# Patient Record
Sex: FEMALE | Race: OTHER | Employment: UNEMPLOYED | URBAN - METROPOLITAN AREA
[De-identification: names, ages, dates, MRNs, and addresses within clinical notes are randomized per-mention and may not be internally consistent; named-entity substitution may affect disease eponyms.]

---

## 2018-04-19 ENCOUNTER — TELEPHONE (OUTPATIENT)
Dept: OBGYN CLINIC | Facility: CLINIC | Age: 23
End: 2018-04-19

## 2018-04-19 NOTE — TELEPHONE ENCOUNTER
Per pt she states that she was told her records were faxed last week. Informed pt that records have not been received yet. She will be calling practice to have them refax them.

## 2018-04-30 NOTE — TELEPHONE ENCOUNTER
Pt calling to check on records. Records were in  and placed on 1 [facility] desk for review. Informed pt that providers will need to review records and a call will be made to her once they've made a decision. Verbalized understanding.

## 2018-04-30 NOTE — TELEPHONE ENCOUNTER
Informed pt that records have not been received yet. [facility] she will try to get records personally and bring them herself to office for them to be reviewed. [facility] she has not had a recent apt and is currently about 28wks.

## 2018-04-30 NOTE — TELEPHONE ENCOUNTER
Pt called back and states she was told by clinic that it would take up to 30 days for her to be able to get records/us to receive her records. She states she will try her best to obtain them and will call office back.

## 2018-05-02 NOTE — TELEPHONE ENCOUNTER
Pt informed that per MLM and AJB she can start PN care with office. Scheduled PCV with MLM this Monday 5/7.

## 2018-05-07 ENCOUNTER — OFFICE VISIT (OUTPATIENT)
Dept: OBGYN CLINIC | Facility: CLINIC | Age: 23
End: 2018-05-07

## 2018-05-07 ENCOUNTER — TELEPHONE (OUTPATIENT)
Dept: OBGYN CLINIC | Facility: CLINIC | Age: 23
End: 2018-05-07

## 2018-05-07 ENCOUNTER — NURSE ONLY (OUTPATIENT)
Dept: OBGYN CLINIC | Facility: CLINIC | Age: 23
End: 2018-05-07

## 2018-05-07 VITALS — SYSTOLIC BLOOD PRESSURE: 109 MMHG | DIASTOLIC BLOOD PRESSURE: 65 MMHG

## 2018-05-07 DIAGNOSIS — Z34.83 ENCOUNTER FOR SUPERVISION OF OTHER NORMAL PREGNANCY IN THIRD TRIMESTER: Primary | ICD-10-CM

## 2018-05-07 DIAGNOSIS — Z34.03 ENCOUNTER FOR SUPERVISION OF NORMAL FIRST PREGNANCY IN THIRD TRIMESTER: Primary | ICD-10-CM

## 2018-05-07 PROCEDURE — 0502F SUBSEQUENT PRENATAL CARE: CPT | Performed by: OBSTETRICS & GYNECOLOGY

## 2018-05-07 RX ORDER — PRENATAL VIT/IRON FUM/FOLIC AC 27MG-0.8MG
1 TABLET ORAL DAILY
COMMUNITY

## 2018-05-07 NOTE — TELEPHONE ENCOUNTER
Reviewed Prenatal Record from [facility]. Was unable to read PN lab results. Called [facility] at [phone] to request new fax.   Was told by staff member Dennie Leber that they would refax, but couldn't do it at this time because fax wasn't Ambika Haynes

## 2018-05-07 NOTE — PROGRESS NOTES
Pt here today for OB RN  Education visit. Pt currently 29w0d by LMP. . Pt transfer of care from [facility].    Educational material reviewed including nutrition, exercise, warning signs, labor precautions. Pt verbalized understanding.  1 hr

## 2018-05-07 NOTE — PROGRESS NOTES
HPI:    Patient ID: Viviana Hart is a 23year old female. HPI  Patient here for PCV. Transfer of Care. Previous  followed by successful . Desires . Needs CBC and 1 hour today. Records reviewed.   This is a 30 minute visit and gr

## 2018-05-08 ENCOUNTER — TELEPHONE (OUTPATIENT)
Dept: OBGYN CLINIC | Facility: CLINIC | Age: 23
End: 2018-05-08

## 2018-05-08 ENCOUNTER — LAB ENCOUNTER (OUTPATIENT)
Dept: LAB | Facility: HOSPITAL | Age: 23
End: 2018-05-08
Attending: OBSTETRICS & GYNECOLOGY
Payer: MEDICAID

## 2018-05-08 DIAGNOSIS — Z34.83 ENCOUNTER FOR SUPERVISION OF OTHER NORMAL PREGNANCY IN THIRD TRIMESTER: ICD-10-CM

## 2018-05-08 PROCEDURE — 36415 COLL VENOUS BLD VENIPUNCTURE: CPT

## 2018-05-08 PROCEDURE — 85025 COMPLETE CBC W/AUTO DIFF WBC: CPT

## 2018-05-08 PROCEDURE — 82950 GLUCOSE TEST: CPT

## 2018-05-08 NOTE — TELEPHONE ENCOUNTER
Message   Received: Today   Message Contents   Garrick Pascual MD  [facility] Clinical Staff             Normal 1 hour GTT.  Notify patient. Rosibel Mcknight is slightly anemic.  May take Iron in addition to her PNV.

## 2018-05-14 ENCOUNTER — ROUTINE PRENATAL (OUTPATIENT)
Dept: OBGYN CLINIC | Facility: CLINIC | Age: 23
End: 2018-05-14

## 2018-05-14 ENCOUNTER — TELEPHONE (OUTPATIENT)
Dept: OBGYN CLINIC | Facility: CLINIC | Age: 23
End: 2018-05-14

## 2018-05-14 VITALS — WEIGHT: 157 LBS | BODY MASS INDEX: 27 KG/M2 | SYSTOLIC BLOOD PRESSURE: 110 MMHG | DIASTOLIC BLOOD PRESSURE: 62 MMHG

## 2018-05-14 DIAGNOSIS — Z34.83 ENCOUNTER FOR SUPERVISION OF OTHER NORMAL PREGNANCY IN THIRD TRIMESTER: Primary | ICD-10-CM

## 2018-05-14 DIAGNOSIS — Z23 NEED FOR VACCINATION: ICD-10-CM

## 2018-05-14 PROCEDURE — 81003 URINALYSIS AUTO W/O SCOPE: CPT | Performed by: ADVANCED PRACTICE MIDWIFE

## 2018-05-14 PROCEDURE — 0502F SUBSEQUENT PRENATAL CARE: CPT | Performed by: ADVANCED PRACTICE MIDWIFE

## 2018-05-14 PROCEDURE — 90715 TDAP VACCINE 7 YRS/> IM: CPT | Performed by: ADVANCED PRACTICE MIDWIFE

## 2018-05-14 PROCEDURE — 90471 IMMUNIZATION ADMIN: CPT | Performed by: ADVANCED PRACTICE MIDWIFE

## 2018-05-14 RX ORDER — BREAST PUMP
EACH MISCELLANEOUS
Qty: 1 EACH | Refills: 0 | Status: SHIPPED | OUTPATIENT
Start: 2018-05-14

## 2018-05-14 RX ORDER — BREAST PUMP
EACH MISCELLANEOUS
Qty: 1 EACH | Refills: 0 | Status: SHIPPED | OUTPATIENT
Start: 2018-05-14 | End: 2018-06-14

## 2018-05-14 NOTE — PROGRESS NOTES
S.  Denies BENDER, vision change, URQ pain, swelling  Baby is active its a girl. EDPS 0, is a homemaker.   Patient transferred care in third trimester from another office  O  See above  A.  [id number] S = D at 30 weeks  Previous  followed by [facility]

## 2018-05-14 NOTE — TELEPHONE ENCOUNTER
Pharmacy calling states pt was prescribed a breast pump and they don't have that or provide that at the pharmacy. Pharmacy tried contacting pt.

## 2018-05-18 NOTE — TELEPHONE ENCOUNTER
No records faxed. [employer] again to f/u. Was informed by office they would fax labs/ u/s now.

## 2018-05-29 ENCOUNTER — ROUTINE PRENATAL (OUTPATIENT)
Dept: OBGYN CLINIC | Facility: CLINIC | Age: 23
End: 2018-05-29

## 2018-05-29 VITALS
DIASTOLIC BLOOD PRESSURE: 65 MMHG | SYSTOLIC BLOOD PRESSURE: 102 MMHG | WEIGHT: 156.63 LBS | HEART RATE: 90 BPM | BODY MASS INDEX: 27 KG/M2

## 2018-05-29 DIAGNOSIS — Z34.83 ENCOUNTER FOR SUPERVISION OF OTHER NORMAL PREGNANCY IN THIRD TRIMESTER: Primary | ICD-10-CM

## 2018-05-29 PROCEDURE — 81002 URINALYSIS NONAUTO W/O SCOPE: CPT | Performed by: OBSTETRICS & GYNECOLOGY

## 2018-05-29 PROCEDURE — 0502F SUBSEQUENT PRENATAL CARE: CPT | Performed by: OBSTETRICS & GYNECOLOGY

## 2018-05-29 NOTE — PROGRESS NOTES
Good fm. Hx of prior c/s, prior . Pt req us for growth 3rd trimester, order entered, to schedule in 2-3 weeks.

## 2018-06-06 ENCOUNTER — TELEPHONE (OUTPATIENT)
Dept: OBGYN CLINIC | Facility: CLINIC | Age: 23
End: 2018-06-06

## 2018-06-06 NOTE — TELEPHONE ENCOUNTER
Pt at dentist now and needs a note or form for ok for pt to be seen.  Please advise dentist fax # [phone]

## 2018-06-11 ENCOUNTER — TELEPHONE (OUTPATIENT)
Dept: OBGYN CLINIC | Facility: CLINIC | Age: 23
End: 2018-06-11

## 2018-06-11 DIAGNOSIS — Z98.891 PREVIOUS CESAREAN SECTION: Primary | ICD-10-CM

## 2018-06-12 NOTE — TELEPHONE ENCOUNTER
[place]i phone line  #[phone] used to translate phone call. Message left on pt's voicemail that her U/S was ordered and she can call [phone] to schedule appointment. Pt can call [phone] with any questions or concerns.

## 2018-06-13 ENCOUNTER — HOSPITAL ENCOUNTER (OUTPATIENT)
Dept: PERINATAL CARE | Facility: HOSPITAL | Age: 23
Discharge: HOME | End: 2018-06-13
Attending: OBSTETRICS & GYNECOLOGY
Payer: MEDICAID

## 2018-06-13 VITALS — DIASTOLIC BLOOD PRESSURE: 67 MMHG | SYSTOLIC BLOOD PRESSURE: 110 MMHG | HEART RATE: 114 BPM

## 2018-06-13 DIAGNOSIS — Z98.891 PREVIOUS CESAREAN SECTION: ICD-10-CM

## 2018-06-13 DIAGNOSIS — O09.30 LATE PRENATAL CARE: Primary | ICD-10-CM

## 2018-06-13 DIAGNOSIS — Z36.3 ENCOUNTER FOR ANTENATAL SCREENING FOR MALFORMATION USING ULTRASOUND: ICD-10-CM

## 2018-06-13 DIAGNOSIS — O09.30 LATE PRENATAL CARE: ICD-10-CM

## 2018-06-13 PROCEDURE — 76811 OB US DETAILED SNGL FETUS: CPT | Performed by: OBSTETRICS & GYNECOLOGY

## 2018-06-13 PROCEDURE — 99243 OFF/OP CNSLTJ NEW/EST LOW 30: CPT | Performed by: OBSTETRICS & GYNECOLOGY

## 2018-06-13 NOTE — PROGRESS NOTES
Moira Phalen is a 23year old female. Reason for Consult:   Late prenatal care. Doing well. No complaints.     Review of History:     OB History:    Obstetric History     T2    L2    SAB0  TAB0  Ectopic0  Multiple0  Live Births2 OF BREAST, NEEDLE CORE Right  10/3/2016: BREAST BIOPSY Right      Comment: I & D breast abscess  2012:    Family History   Problem Relation Age of Onset   • Asthma Maternal Grandmother    • Arthritis Maternal Grandfather       Smoking status:   RECOMMENDATIONS:   1. Fetal movement counts discussed    Thank you for allowing me to participate in the care of your patient. Please do not hesitate to call with any questions or concerns.      Total patient time was 40 minutes in evaluation, c

## 2018-06-14 ENCOUNTER — ROUTINE PRENATAL (OUTPATIENT)
Dept: OBGYN CLINIC | Facility: CLINIC | Age: 23
End: 2018-06-14

## 2018-06-14 VITALS
DIASTOLIC BLOOD PRESSURE: 63 MMHG | BODY MASS INDEX: 27 KG/M2 | HEART RATE: 81 BPM | WEIGHT: 155 LBS | SYSTOLIC BLOOD PRESSURE: 103 MMHG

## 2018-06-14 DIAGNOSIS — Z34.83 ENCOUNTER FOR SUPERVISION OF OTHER NORMAL PREGNANCY IN THIRD TRIMESTER: Primary | ICD-10-CM

## 2018-06-14 PROCEDURE — 0502F SUBSEQUENT PRENATAL CARE: CPT | Performed by: OBSTETRICS & GYNECOLOGY

## 2018-06-14 PROCEDURE — 81002 URINALYSIS NONAUTO W/O SCOPE: CPT | Performed by: OBSTETRICS & GYNECOLOGY

## 2018-06-25 ENCOUNTER — ROUTINE PRENATAL (OUTPATIENT)
Dept: OBGYN CLINIC | Facility: CLINIC | Age: 23
End: 2018-06-25

## 2018-06-25 VITALS
DIASTOLIC BLOOD PRESSURE: 62 MMHG | SYSTOLIC BLOOD PRESSURE: 114 MMHG | WEIGHT: 155 LBS | BODY MASS INDEX: 27 KG/M2 | HEART RATE: 99 BPM

## 2018-06-25 DIAGNOSIS — Z34.83 ENCOUNTER FOR SUPERVISION OF OTHER NORMAL PREGNANCY IN THIRD TRIMESTER: Primary | ICD-10-CM

## 2018-06-25 PROCEDURE — 81002 URINALYSIS NONAUTO W/O SCOPE: CPT | Performed by: OBSTETRICS & GYNECOLOGY

## 2018-06-25 PROCEDURE — 0502F SUBSEQUENT PRENATAL CARE: CPT | Performed by: OBSTETRICS & GYNECOLOGY

## 2018-06-25 NOTE — PROGRESS NOTES
[facility], [facility]  Obstetrics and Gynecology  Prenatal Visit  Ellis Pearl MD    MOISÉS Watt is a 23year old.o. Avelino Justice 36w0d weeks. Here for routine prenatal visit and is without complaints.   Patient denies any regular uterine contra pregnancy in third trimester  (primary encounter diagnosis)  Plan: URINALYSIS NONAUTO W/O SCOPE, STREP B CULTURE    Plan   Group B beta strep culture done. Labor instructions given. Patient follow-up in 1 week for prenatal visit.   Kian Davila MD, [facility]

## 2018-07-05 ENCOUNTER — TELEPHONE (OUTPATIENT)
Dept: OBGYN CLINIC | Facility: CLINIC | Age: 23
End: 2018-07-05

## 2018-07-05 NOTE — TELEPHONE ENCOUNTER
Dr. Anne Raymond, pt has [employer] now, which we do not accept anymore. Is this patient a high risk pt whom we should keep? Or continue with advising them to contact a provider within that network. Please advise.

## 2018-07-05 NOTE — TELEPHONE ENCOUNTER
37 6/7 weeks pregnant, per pt she has O [place] now, would like know if she needs to change OB GYN, or can the office make a exception ?

## 2018-07-06 NOTE — TELEPHONE ENCOUNTER
P/A faxed to [facility]. Pt inquired where she can get her lab work done. The hospital refused her and told her she couldn't get her lab work here due to new insurance change. I instructed her to contact [facility] and see who is in network for that, and if they needed anything from us to contact our office.

## 2018-07-06 NOTE — TELEPHONE ENCOUNTER
Spoke to pt and explained we will need to authorize each PN visit, due to JEANINE's message below. Transferred her call to [facility] who will make those future appts for her. Explained to her that we will continue to see her through her PN and PP, but after that she will need to contact a provider who accepts [employer] for all future Gyne. She mentioned she has an office in mind that will accept her with new insurance. She Understood and verbalized agreement.

## 2018-07-09 ENCOUNTER — ROUTINE PRENATAL (OUTPATIENT)
Dept: OBGYN CLINIC | Facility: CLINIC | Age: 23
End: 2018-07-09

## 2018-07-09 VITALS
WEIGHT: 156.63 LBS | BODY MASS INDEX: 27 KG/M2 | DIASTOLIC BLOOD PRESSURE: 59 MMHG | SYSTOLIC BLOOD PRESSURE: 102 MMHG | HEART RATE: 88 BPM

## 2018-07-09 DIAGNOSIS — Z34.83 ENCOUNTER FOR SUPERVISION OF OTHER NORMAL PREGNANCY IN THIRD TRIMESTER: Primary | ICD-10-CM

## 2018-07-09 LAB
APPEARANCE: CLEAR
MULTISTIX LOT#: NORMAL NUMERIC
PH, URINE: 6.5 (ref 4.5–8)
SPECIFIC GRAVITY: 1.02 (ref 1–1.03)
URINE-COLOR: YELLOW
UROBILINOGEN,SEMI-QN: 0.2 MG/DL (ref 0–1.9)

## 2018-07-09 PROCEDURE — 81002 URINALYSIS NONAUTO W/O SCOPE: CPT | Performed by: OBSTETRICS & GYNECOLOGY

## 2018-07-09 PROCEDURE — 0502F SUBSEQUENT PRENATAL CARE: CPT | Performed by: OBSTETRICS & GYNECOLOGY

## 2018-07-09 NOTE — TELEPHONE ENCOUNTER
Pt returning call with [facility] care number  States [facility] has not received anything from [facility]  [phone]

## 2018-07-11 ENCOUNTER — HOSPITAL ENCOUNTER (INPATIENT)
Facility: HOSPITAL | Age: 23
LOS: 2 days | Discharge: HOME | End: 2018-07-13
Attending: OBSTETRICS & GYNECOLOGY | Admitting: OBSTETRICS & GYNECOLOGY
Payer: COMMERCIAL

## 2018-07-11 ENCOUNTER — TELEPHONE (OUTPATIENT)
Dept: OBGYN CLINIC | Facility: CLINIC | Age: 23
End: 2018-07-11

## 2018-07-11 PROBLEM — O42.90 AMNIOTIC FLUID LEAKING: Status: ACTIVE | Noted: 2018-07-11

## 2018-07-11 LAB
ANTIBODY SCREEN: NEGATIVE
ERYTHROCYTE [DISTWIDTH] IN BLOOD BY AUTOMATED COUNT: 14.3 % (ref 11–15)
HCT VFR BLD AUTO: 31.9 % (ref 35–48)
HGB BLD-MCNC: 10.4 G/DL (ref 12–16)
MCH RBC QN AUTO: 27.8 PG (ref 27–32)
MCHC RBC AUTO-ENTMCNC: 32.5 G/DL (ref 32–37)
MCV RBC AUTO: 85.5 FL (ref 80–100)
PLATELET # BLD AUTO: 215 K/UL (ref 140–400)
PMV BLD AUTO: 7.1 FL (ref 7.4–10.3)
RBC # BLD AUTO: 3.73 M/UL (ref 3.7–5.4)
RH BLOOD TYPE: POSITIVE
WBC # BLD AUTO: 6 K/UL (ref 4–11)

## 2018-07-11 PROCEDURE — 0KQM0ZZ REPAIR PERINEUM MUSCLE, OPEN APPROACH: ICD-10-PCS | Performed by: OBSTETRICS & GYNECOLOGY

## 2018-07-11 RX ORDER — LIDOCAINE HYDROCHLORIDE 10 MG/ML
30 INJECTION, SOLUTION EPIDURAL; INFILTRATION; INTRACAUDAL; PERINEURAL ONCE
Status: COMPLETED | OUTPATIENT
Start: 2018-07-11 | End: 2018-07-11

## 2018-07-11 RX ORDER — TRISODIUM CITRATE DIHYDRATE AND CITRIC ACID MONOHYDRATE 500; 334 MG/5ML; MG/5ML
30 SOLUTION ORAL AS NEEDED
Status: DISCONTINUED | OUTPATIENT
Start: 2018-07-11 | End: 2018-07-11 | Stop reason: HOSPADM

## 2018-07-11 RX ORDER — DEXTROSE, SODIUM CHLORIDE, SODIUM LACTATE, POTASSIUM CHLORIDE, AND CALCIUM CHLORIDE 5; .6; .31; .03; .02 G/100ML; G/100ML; G/100ML; G/100ML; G/100ML
125 INJECTION, SOLUTION INTRAVENOUS CONTINUOUS
Status: DISCONTINUED | OUTPATIENT
Start: 2018-07-11 | End: 2018-07-11 | Stop reason: HOSPADM

## 2018-07-11 RX ORDER — SODIUM CHLORIDE, SODIUM LACTATE, POTASSIUM CHLORIDE, CALCIUM CHLORIDE 600; 310; 30; 20 MG/100ML; MG/100ML; MG/100ML; MG/100ML
INJECTION, SOLUTION INTRAVENOUS CONTINUOUS
Status: DISCONTINUED | OUTPATIENT
Start: 2018-07-11 | End: 2018-07-11 | Stop reason: HOSPADM

## 2018-07-11 RX ORDER — TERBUTALINE SULFATE 1 MG/ML
0.25 INJECTION, SOLUTION SUBCUTANEOUS AS NEEDED
Status: DISCONTINUED | OUTPATIENT
Start: 2018-07-11 | End: 2018-07-11 | Stop reason: HOSPADM

## 2018-07-11 RX ORDER — AMMONIA INHALANTS 0.04 G/.3ML
0.3 INHALANT RESPIRATORY (INHALATION) AS NEEDED
Status: DISCONTINUED | OUTPATIENT
Start: 2018-07-11 | End: 2018-07-11 | Stop reason: HOSPADM

## 2018-07-11 RX ORDER — IBUPROFEN 600 MG/1
600 TABLET ORAL ONCE AS NEEDED
Status: DISCONTINUED | OUTPATIENT
Start: 2018-07-11 | End: 2018-07-11 | Stop reason: HOSPADM

## 2018-07-11 RX ORDER — SODIUM CHLORIDE 0.9 % (FLUSH) 0.9 %
10 SYRINGE (ML) INJECTION AS NEEDED
Status: DISCONTINUED | OUTPATIENT
Start: 2018-07-11 | End: 2018-07-11 | Stop reason: HOSPADM

## 2018-07-12 LAB
BASOPHILS # BLD: 0 K/UL (ref 0–0.2)
BASOPHILS NFR BLD: 0 %
EOSINOPHIL # BLD: 0 K/UL (ref 0–0.7)
EOSINOPHIL NFR BLD: 1 %
ERYTHROCYTE [DISTWIDTH] IN BLOOD BY AUTOMATED COUNT: 14.5 % (ref 11–15)
HCT VFR BLD AUTO: 31.8 % (ref 35–48)
HGB BLD-MCNC: 10.4 G/DL (ref 12–16)
HIV1+2 AB SPEC QL IA.RAPID: NONREACTIVE
LYMPHOCYTES # BLD: 1.1 K/UL (ref 1–4)
LYMPHOCYTES NFR BLD: 15 %
MCH RBC QN AUTO: 28.2 PG (ref 27–32)
MCHC RBC AUTO-ENTMCNC: 32.6 G/DL (ref 32–37)
MCV RBC AUTO: 86.7 FL (ref 80–100)
MONOCYTES # BLD: 0.7 K/UL (ref 0–1)
MONOCYTES NFR BLD: 10 %
NEUTROPHILS # BLD AUTO: 5.4 K/UL (ref 1.8–7.7)
NEUTROPHILS NFR BLD: 74 %
PLATELET # BLD AUTO: 206 K/UL (ref 140–400)
PMV BLD AUTO: 6.7 FL (ref 7.4–10.3)
RBC # BLD AUTO: 3.67 M/UL (ref 3.7–5.4)
WBC # BLD AUTO: 7.3 K/UL (ref 4–11)

## 2018-07-12 RX ORDER — ONDANSETRON 2 MG/ML
4 INJECTION INTRAMUSCULAR; INTRAVENOUS EVERY 6 HOURS PRN
Status: DISCONTINUED | OUTPATIENT
Start: 2018-07-12 | End: 2018-07-13

## 2018-07-12 RX ORDER — DOCUSATE SODIUM 100 MG/1
100 CAPSULE, LIQUID FILLED ORAL 2 TIMES DAILY
Status: DISCONTINUED | OUTPATIENT
Start: 2018-07-12 | End: 2018-07-13

## 2018-07-12 RX ORDER — DIAPER,BRIEF,INFANT-TODD,DISP
1 EACH MISCELLANEOUS EVERY 6 HOURS PRN
Status: DISCONTINUED | OUTPATIENT
Start: 2018-07-12 | End: 2018-07-13

## 2018-07-12 RX ORDER — PRENATAL VIT,CAL 76/IRON/FOLIC 29 MG-1 MG
1 TABLET ORAL DAILY
Status: DISCONTINUED | OUTPATIENT
Start: 2018-07-12 | End: 2018-07-13

## 2018-07-12 RX ORDER — SODIUM CHLORIDE 0.9 % (FLUSH) 0.9 %
10 SYRINGE (ML) INJECTION AS NEEDED
Status: DISCONTINUED | OUTPATIENT
Start: 2018-07-12 | End: 2018-07-13

## 2018-07-12 RX ORDER — BISACODYL 10 MG
10 SUPPOSITORY, RECTAL RECTAL ONCE AS NEEDED
Status: DISCONTINUED | OUTPATIENT
Start: 2018-07-12 | End: 2018-07-13

## 2018-07-12 RX ORDER — IBUPROFEN 600 MG/1
600 TABLET ORAL EVERY 6 HOURS PRN
Status: DISCONTINUED | OUTPATIENT
Start: 2018-07-12 | End: 2018-07-13

## 2018-07-12 RX ORDER — SIMETHICONE 80 MG
80 TABLET,CHEWABLE ORAL 3 TIMES DAILY PRN
Status: DISCONTINUED | OUTPATIENT
Start: 2018-07-12 | End: 2018-07-13

## 2018-07-12 RX ORDER — ACETAMINOPHEN 325 MG/1
650 TABLET ORAL EVERY 6 HOURS PRN
Status: DISCONTINUED | OUTPATIENT
Start: 2018-07-12 | End: 2018-07-13

## 2018-07-12 RX ORDER — AMMONIA INHALANTS 0.04 G/.3ML
0.3 INHALANT RESPIRATORY (INHALATION) AS NEEDED
Status: DISCONTINUED | OUTPATIENT
Start: 2018-07-12 | End: 2018-07-13

## 2018-07-12 NOTE — TELEPHONE ENCOUNTER
Patient is 38wks preg, water broke has vaginal leakage and is calling to inform she is on her way to [facility]. Per Gardenia Oneill RN, informed to go to [facility] ASAP. Patient agreed and verbalized understanding. Dr. Nitin Mauro on call informed and FBC.

## 2018-07-12 NOTE — PLAN OF CARE
Will report anxiety at manageable levels Progressing      Verbalizes/displays adequate comfort level or patient's stated pain goal Progressing      Patient preferences are identified and integrated in the patient's plan of care Progressing      Patient/Fam

## 2018-07-12 NOTE — PLAN OF CARE
Vitals to remain wnl, will void freely, will provide for adequate rest periods. Will tolerate general diet in am.  Will continue plan of care.

## 2018-07-12 NOTE — PLAN OF CARE
Problem: POSTPARTUM  Goal: Optimize infant feeding at the breast  INTERVENTIONS:  - Initiate breast feeding within first hour after birth. - Monitor effectiveness of current breast feeding efforts. - Assess support systems available to mother/family.   - to get a pump from insurance.

## 2018-07-12 NOTE — PROGRESS NOTES
PPD 1  Pt doing well, no c/o    Alert and oriented, nad  Chest cta  Heart rrr  abd soft,nt, fundus firm  Ext nt  Hb 10.4    A/p ppd 1  S/p   Pt doing well. No c/o.

## 2018-07-12 NOTE — LACTATION NOTE
LACTATION NOTE - MOTHER      Evaluation Type: Inpatient    Problems identified  Problems identified: Knowledge deficit;Previous breast surgery  Previous breast surgery:  (R breast biopsy, progressed to abscess)    Maternal history  Maternal history: Anemia

## 2018-07-12 NOTE — L&D DELIVERY NOTE
[facility]    Vaginal Delivery Note    Sofi Mcdermott Patient Status:  Inpatient    1995 MRN [id number]   Location [address]  Attending Breanna Mcguire, Shea Womack MD   [facility] Day [facility] 0 PCP Bro Kaufman

## 2018-07-12 NOTE — PROGRESS NOTES
Patient transferred to [facility] in stable condition following  delivery. Assessment, bleeding, VS WNL. Report given to Elke Valles RN.

## 2018-07-12 NOTE — H&P
[address] Patient Status:  Inpatient    1995 MRN [id number]   Location [facility] Attending Britni Pearl MD   [facility] Day # 0 PCP Maribell Bryan, Onset   • Asthma Maternal Grandmother    • Arthritis Maternal Grandfather      Social History:    Smoking status: Never Smoker    Smokeless tobacco: Never Used    Alcohol use No        Allergies/Medications:    Allergies:   No Known Allergies  Medications: 07/09/2018   UROBILINOGEN <2.0 12/29/2015   LEUUR Negative 12/29/2015   BLDCUL No Growth 5 Days 10/02/2016               Assessment/Plan:    Art Isatu is at an estimated gestational age of 36w4d with an estimated date of delivery of:  7/23/2

## 2018-07-13 VITALS
DIASTOLIC BLOOD PRESSURE: 60 MMHG | HEART RATE: 80 BPM | SYSTOLIC BLOOD PRESSURE: 108 MMHG | TEMPERATURE: 98 F | RESPIRATION RATE: 16 BRPM

## 2018-07-13 LAB — T PALLIDUM AB SER QL: NEGATIVE

## 2018-07-13 RX ORDER — PSEUDOEPHEDRINE HCL 30 MG
100 TABLET ORAL 2 TIMES DAILY
Qty: 30 CAPSULE | Refills: 0 | Status: SHIPPED | OUTPATIENT
Start: 2018-07-13

## 2018-07-13 RX ORDER — IBUPROFEN 600 MG/1
600 TABLET ORAL EVERY 6 HOURS PRN
Qty: 30 TABLET | Refills: 0 | Status: SHIPPED | OUTPATIENT
Start: 2018-07-13

## 2018-07-13 NOTE — DISCHARGE SUMMARY
[facility]    Discharge Summary/Discharge Note    Berhane Treadwell Patient Status:  Inpatient    1995 MRN [id number]   Location [facility] 3SE Attending Ramesh Foote, [facility] Day # 2 PCP Oksana Cohn, UROBILINOGEN <2.0 2015   LEUUR Negative 2015   BLDCUL No Growth 5 Days 10/02/2016       Assessment/Plan     A: 23 y.o.  status post successful . Patient is postpartum day #2 in good condition and ready to go home.     Plan for disc 10 minutes:        Intrapartum Complications: None  Feeding Method: both breast and bottle fed   Rh Immune Globulin Given: no  Rubella Vaccine Given: no  Discharge Plan:   Discharge Condition: Good    Discharge medications:  Current Discha

## 2018-07-13 NOTE — PLAN OF CARE
ANXIETY    • Will report anxiety at manageable levels Adequate for Discharge        GASTROINTESTINAL - ADULT    • Minimal or absence of nausea and vomiting Adequate for Discharge    • Maintains or returns to baseline bowel function Adequate for Discharge

## 2018-07-25 ENCOUNTER — TELEPHONE (OUTPATIENT)
Dept: LACTATION | Facility: HOSPITAL | Age: 23
End: 2018-07-25

## 2020-09-11 ENCOUNTER — TELEPHONE (OUTPATIENT)
Dept: OBGYN CLINIC | Facility: CLINIC | Age: 25
End: 2020-09-11

## 2020-09-11 NOTE — TELEPHONE ENCOUNTER
Pt Name and  verified. Pt states that she was not aware of having a tampon and states she placed another tampon. However, she does not remember taking the first tampon off. States that she noticed some d/c and foul odor for about a month.  Last night

## 2020-09-11 NOTE — TELEPHONE ENCOUNTER
If she knows for sure that she removed that \"forgotten\" tampon, and is feeling well without fever, chills, pain, etc, then she can just monitor for the next couple of days.   I would recommend no vaginal sexual relations for one week minimum to reduce ris

## 2020-09-11 NOTE — TELEPHONE ENCOUNTER
Djib[place]ian phone line  #[phone] used to translate phone call. Pt called and informed of ajb's message and recommendations. Pt voices understanding.

## (undated) NOTE — LETTER
6/6/2018              Melodie Marquez        [address] [address]         To whom it may concern,    Tami Farias is currently under our care for pregnancy. It is permissible at her gestational age for dental work to be performed. However, if x-rays are needed, please make sure that the abdomen and thyroid gland are shielded appropriately, and thoroughly. Analgesia is also permissible as long as there are no vasoconstrictors used. For post treatment pain relief, Tylenol or Tylenol #3 is acceptable. If an antibiotic is needed, a penicillin derivative may be used. If you have any additional concerns, do not hesitate to contact our office at [phone].     Sincerely,    [facility], [address] [place] [address]  [phone]        Document electronically generated by:  Dameon Zayas

## (undated) NOTE — Clinical Note
1. IUP @  34w2d  2. Scan consistent with dates 3. No fetal structural abnormalities seen 4. Late prenatal care 5.   Previous